# Patient Record
Sex: MALE | Race: WHITE | Employment: FULL TIME
[De-identification: names, ages, dates, MRNs, and addresses within clinical notes are randomized per-mention and may not be internally consistent; named-entity substitution may affect disease eponyms.]

---

## 2024-04-02 ENCOUNTER — OFFICE VISIT (OUTPATIENT)
Dept: FAMILY MEDICINE CLINIC | Facility: CLINIC | Age: 24
End: 2024-04-02

## 2024-04-02 VITALS
HEART RATE: 76 BPM | TEMPERATURE: 98.7 F | DIASTOLIC BLOOD PRESSURE: 86 MMHG | WEIGHT: 224.8 LBS | BODY MASS INDEX: 34.07 KG/M2 | OXYGEN SATURATION: 98 % | SYSTOLIC BLOOD PRESSURE: 128 MMHG | HEIGHT: 68 IN

## 2024-04-02 DIAGNOSIS — R00.2 PALPITATIONS: Primary | ICD-10-CM

## 2024-04-02 DIAGNOSIS — Z00.00 VISIT FOR WELL MAN HEALTH CHECK: ICD-10-CM

## 2024-04-02 DIAGNOSIS — I51.7 LVH (LEFT VENTRICULAR HYPERTROPHY): ICD-10-CM

## 2024-04-02 DIAGNOSIS — Z13.220 SCREENING FOR LIPOID DISORDERS: ICD-10-CM

## 2024-04-02 DIAGNOSIS — R94.31 ELECTROCARDIOGRAM SHOWING T WAVE ABNORMALITIES: ICD-10-CM

## 2024-04-02 DIAGNOSIS — H61.23 BILATERAL IMPACTED CERUMEN: ICD-10-CM

## 2024-04-02 SDOH — ECONOMIC STABILITY: HOUSING INSECURITY
IN THE LAST 12 MONTHS, WAS THERE A TIME WHEN YOU DID NOT HAVE A STEADY PLACE TO SLEEP OR SLEPT IN A SHELTER (INCLUDING NOW)?: NO

## 2024-04-02 SDOH — ECONOMIC STABILITY: FOOD INSECURITY: WITHIN THE PAST 12 MONTHS, YOU WORRIED THAT YOUR FOOD WOULD RUN OUT BEFORE YOU GOT MONEY TO BUY MORE.: NEVER TRUE

## 2024-04-02 SDOH — ECONOMIC STABILITY: FOOD INSECURITY: WITHIN THE PAST 12 MONTHS, THE FOOD YOU BOUGHT JUST DIDN'T LAST AND YOU DIDN'T HAVE MONEY TO GET MORE.: NEVER TRUE

## 2024-04-02 SDOH — ECONOMIC STABILITY: INCOME INSECURITY: HOW HARD IS IT FOR YOU TO PAY FOR THE VERY BASICS LIKE FOOD, HOUSING, MEDICAL CARE, AND HEATING?: SOMEWHAT HARD

## 2024-04-02 ASSESSMENT — PATIENT HEALTH QUESTIONNAIRE - PHQ9
2. FEELING DOWN, DEPRESSED OR HOPELESS: NOT AT ALL
SUM OF ALL RESPONSES TO PHQ QUESTIONS 1-9: 0
1. LITTLE INTEREST OR PLEASURE IN DOING THINGS: NOT AT ALL
SUM OF ALL RESPONSES TO PHQ QUESTIONS 1-9: 0
SUM OF ALL RESPONSES TO PHQ9 QUESTIONS 1 & 2: 0

## 2024-04-05 ASSESSMENT — ENCOUNTER SYMPTOMS
COUGH: 0
SHORTNESS OF BREATH: 0
BLOOD IN STOOL: 0
PHOTOPHOBIA: 0
COLOR CHANGE: 0
SORE THROAT: 0
ABDOMINAL DISTENTION: 0
NAUSEA: 0
ABDOMINAL PAIN: 0

## 2024-04-05 NOTE — PROGRESS NOTES
Odilon Brower  2000 is a 24 y.o. male ,Established patient, here for evaluation of the following chief complaint(s):   Chief Complaint   Patient presents with    New Patient     Recently has felt like he has heart palpitations not an every day thing, has not figured out what is causing it, does have family history of heart problems so would like to get checked out-           1. Palpitations  -     EKG 12 Lead--sinus rhythm LVH negative precordial waves  -     Magnesium; Future  -     CK; Future  -     [facility] - Urban Hummel MD, Cardiology, [place]  2. Screening for lipoid disorders  -     Lipid Panel; Future  3. Visit for well [place] [facility] check  -     AMB POC URINALYSIS DIP STICK AUTO W/O MICRO  -     Comprehensive Metabolic Panel; Future  -     CBC with Auto Differential; Future  4. Bilateral impacted cerumen  -     carbamide peroxide (DEBROX) 6.5 % otic solution; Place 5 drops in ear(s) 2 times daily, Disp-15 mL, R-1Normal  5. LVH (left ventricular hypertrophy)--he is advised to monitor bp  -     Urban Bojorquez MD, Cardiology, [place]  6. Electrocardiogram showing T wave abnormalities  -     [facility] Urban Arevalo MD, Cardiology, [place]        Return in about 5 weeks (around 5/7/2024).        Subjective   SUBJECTIVE/OBJECTIVE:  New patient---no depressive sx.    Heart Problem  The current episode started 1 to 4 weeks ago. The problem occurs intermittently. The problem has been resolved (feels like palpitations). Pertinent negatives include no abdominal pain, chest pain, chills, congestion, coughing, fever, headaches, joint swelling, myalgias, nausea, rash, sore throat or weakness.       Review of Systems   Constitutional:  Negative for chills and fever.   HENT:  Negative for congestion, ear pain, hearing loss and sore throat.    Eyes:  Negative for photophobia and pain.   Respiratory:  Negative for cough and shortness of breath.    Cardiovascular:  Negative for chest pain, palpitations

## 2024-04-09 ENCOUNTER — NURSE ONLY (OUTPATIENT)
Dept: FAMILY MEDICINE CLINIC | Facility: CLINIC | Age: 24
End: 2024-04-09

## 2024-04-09 DIAGNOSIS — Z13.220 SCREENING FOR LIPOID DISORDERS: ICD-10-CM

## 2024-04-09 DIAGNOSIS — Z00.00 VISIT FOR WELL MAN HEALTH CHECK: ICD-10-CM

## 2024-04-09 DIAGNOSIS — R00.2 PALPITATIONS: ICD-10-CM

## 2024-04-09 LAB
ALBUMIN SERPL-MCNC: 4.3 G/DL (ref 3.5–5)
ALBUMIN/GLOB SERPL: 1.4 (ref 0.4–1.6)
ALP SERPL-CCNC: 70 U/L (ref 50–136)
ALT SERPL-CCNC: 40 U/L (ref 12–65)
ANION GAP SERPL CALC-SCNC: 4 MMOL/L (ref 2–11)
AST SERPL-CCNC: 31 U/L (ref 15–37)
BASOPHILS # BLD: 0 K/UL (ref 0–0.2)
BASOPHILS NFR BLD: 1 % (ref 0–2)
BILIRUB SERPL-MCNC: 0.6 MG/DL (ref 0.2–1.1)
BUN SERPL-MCNC: 21 MG/DL (ref 6–23)
CALCIUM SERPL-MCNC: 9.5 MG/DL (ref 8.3–10.4)
CHLORIDE SERPL-SCNC: 106 MMOL/L (ref 103–113)
CHOLEST SERPL-MCNC: 163 MG/DL
CK SERPL-CCNC: 762 U/L (ref 21–215)
CO2 SERPL-SCNC: 30 MMOL/L (ref 21–32)
CREAT SERPL-MCNC: 1.2 MG/DL (ref 0.8–1.5)
DIFFERENTIAL METHOD BLD: NORMAL
EOSINOPHIL # BLD: 0.1 K/UL (ref 0–0.8)
EOSINOPHIL NFR BLD: 2 % (ref 0.5–7.8)
ERYTHROCYTE [DISTWIDTH] IN BLOOD BY AUTOMATED COUNT: 12.7 % (ref 11.9–14.6)
GLOBULIN SER CALC-MCNC: 3 G/DL (ref 2.8–4.5)
GLUCOSE SERPL-MCNC: 92 MG/DL (ref 65–100)
HCT VFR BLD AUTO: 43.1 % (ref 41.1–50.3)
HDLC SERPL-MCNC: 71 MG/DL (ref 40–60)
HDLC SERPL: 2.3
HGB BLD-MCNC: 14.6 G/DL (ref 13.6–17.2)
IMM GRANULOCYTES # BLD AUTO: 0 K/UL (ref 0–0.5)
IMM GRANULOCYTES NFR BLD AUTO: 0 % (ref 0–5)
LDLC SERPL CALC-MCNC: 82 MG/DL
LYMPHOCYTES # BLD: 1.8 K/UL (ref 0.5–4.6)
LYMPHOCYTES NFR BLD: 36 % (ref 13–44)
MAGNESIUM SERPL-MCNC: 2.2 MG/DL (ref 1.8–2.4)
MCH RBC QN AUTO: 29.9 PG (ref 26.1–32.9)
MCHC RBC AUTO-ENTMCNC: 33.9 G/DL (ref 31.4–35)
MCV RBC AUTO: 88.1 FL (ref 82–102)
MONOCYTES # BLD: 0.5 K/UL (ref 0.1–1.3)
MONOCYTES NFR BLD: 11 % (ref 4–12)
NEUTS SEG # BLD: 2.5 K/UL (ref 1.7–8.2)
NEUTS SEG NFR BLD: 50 % (ref 43–78)
NRBC # BLD: 0 K/UL (ref 0–0.2)
PLATELET # BLD AUTO: 234 K/UL (ref 150–450)
PMV BLD AUTO: 10.6 FL (ref 9.4–12.3)
POTASSIUM SERPL-SCNC: 4.3 MMOL/L (ref 3.5–5.1)
PROT SERPL-MCNC: 7.3 G/DL (ref 6.3–8.2)
RBC # BLD AUTO: 4.89 M/UL (ref 4.23–5.6)
SODIUM SERPL-SCNC: 140 MMOL/L (ref 136–146)
TRIGL SERPL-MCNC: 50 MG/DL (ref 35–150)
VLDLC SERPL CALC-MCNC: 10 MG/DL (ref 6–23)
WBC # BLD AUTO: 5 K/UL (ref 4.3–11.1)

## 2024-05-01 NOTE — PROGRESS NOTES
[facility] CARDIOLOGY History & Physical                 Reason for Visit: Palpitations and abnormal ECG    Subjective:     Patient is a 24 y.o. male who presents as a referral for an abnormal ECG and palpitations.  The patient had an ECG on April 2 noted sinus bradycardia, LVH, and ST and/or T wave abnormalities secondary to hypertrophy.  The patient reports having a skipped beat after performing strenuous work in the last 14 days.  The patient reports that he previously drank coffee in the morning and in the afternoon.  This was followed by a caffeinated \"pre-workout\" beverage that contained caffeine.  He has since stopped the pre-workout beverage.  He reports \"maybe just a little bit of SOB\".  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  He says that the pain was towards the surface of his chest when it occurred.  He denies hemoptysis.    No past medical history on file.   No past surgical history on file.   No family history on file.   Social History     Tobacco Use    Smoking status: Never    Smokeless tobacco: Never   Substance Use Topics    Alcohol use: Yes     Comment: social      Allergies   Allergen Reactions    Tamiflu [Oseltamivir] Rash         ROS:  No obvious pertinent positives on review of systems except for what was outlined above.       Objective:       /70   Pulse 67   Ht 1.727 m (5' 8\")   Wt 99.1 kg (218 lb 6.4 oz)   SpO2 98%   BMI 33.21 kg/m²     BP Readings from Last 3 Encounters:   05/03/24 128/70   04/02/24 128/86       Wt Readings from Last 3 Encounters:   05/03/24 99.1 kg (218 lb 6.4 oz)   04/02/24 102 kg (224 lb 12.8 oz)       General/Constitutional:   Alert and oriented x 3, no acute distress  HEENT:   normocephalic, atraumatic, moist mucous membranes  Neck:   No JVD or carotid bruits bilaterally  Cardiovascular:   regular rate and rhythm, no rub/gallop appreciated  Pulmonary:   clear to

## 2024-05-03 ENCOUNTER — INITIAL CONSULT (OUTPATIENT)
Age: 24
End: 2024-05-03
Payer: COMMERCIAL

## 2024-05-03 VITALS
HEART RATE: 67 BPM | SYSTOLIC BLOOD PRESSURE: 128 MMHG | BODY MASS INDEX: 33.1 KG/M2 | OXYGEN SATURATION: 98 % | DIASTOLIC BLOOD PRESSURE: 70 MMHG | HEIGHT: 68 IN | WEIGHT: 218.4 LBS

## 2024-05-03 DIAGNOSIS — I51.7 LEFT VENTRICULAR HYPERTROPHY BY ELECTROCARDIOGRAM: Primary | ICD-10-CM

## 2024-05-03 DIAGNOSIS — R00.2 PALPITATIONS: ICD-10-CM

## 2024-05-03 DIAGNOSIS — R07.89 ATYPICAL CHEST PAIN: ICD-10-CM

## 2024-05-03 DIAGNOSIS — R06.00 DYSPNEA, UNSPECIFIED TYPE: ICD-10-CM

## 2024-05-03 PROCEDURE — 99204 OFFICE O/P NEW MOD 45 MIN: CPT | Performed by: INTERNAL MEDICINE

## 2024-05-07 ENCOUNTER — OFFICE VISIT (OUTPATIENT)
Dept: FAMILY MEDICINE CLINIC | Facility: CLINIC | Age: 24
End: 2024-05-07
Payer: COMMERCIAL

## 2024-05-07 VITALS
DIASTOLIC BLOOD PRESSURE: 72 MMHG | WEIGHT: 217.8 LBS | HEART RATE: 83 BPM | HEIGHT: 68 IN | TEMPERATURE: 98.1 F | SYSTOLIC BLOOD PRESSURE: 122 MMHG | OXYGEN SATURATION: 97 % | BODY MASS INDEX: 33.01 KG/M2

## 2024-05-07 DIAGNOSIS — N28.9 RENAL INSUFFICIENCY: ICD-10-CM

## 2024-05-07 DIAGNOSIS — R74.8 ELEVATED CPK: Primary | ICD-10-CM

## 2024-05-07 PROCEDURE — 99213 OFFICE O/P EST LOW 20 MIN: CPT | Performed by: FAMILY MEDICINE

## 2024-05-09 ENCOUNTER — NURSE ONLY (OUTPATIENT)
Dept: FAMILY MEDICINE CLINIC | Facility: CLINIC | Age: 24
End: 2024-05-09

## 2024-05-09 DIAGNOSIS — R74.8 ELEVATED CPK: ICD-10-CM

## 2024-05-09 DIAGNOSIS — N28.9 RENAL INSUFFICIENCY: ICD-10-CM

## 2024-05-09 LAB
CK SERPL-CCNC: 463 U/L (ref 21–215)
CREAT SERPL-MCNC: 1.04 MG/DL (ref 0.8–1.3)

## 2024-05-10 ENCOUNTER — TELEPHONE (OUTPATIENT)
Age: 24
End: 2024-05-10

## 2024-05-10 ASSESSMENT — ENCOUNTER SYMPTOMS
ABDOMINAL PAIN: 0
SHORTNESS OF BREATH: 0
PHOTOPHOBIA: 0
EYE PAIN: 0
SORE THROAT: 0
NAUSEA: 0
COUGH: 0
ABDOMINAL DISTENTION: 0
BLOOD IN STOOL: 0
COLOR CHANGE: 0

## 2024-05-10 NOTE — TELEPHONE ENCOUNTER
Pt had an echocardiogram in the office and had issues with his monitor, per  pt does not need to have another monitor put on, send in monitor. Pt verbalized understanding.

## 2024-05-11 NOTE — PROGRESS NOTES
Odilon Brower  2000 is a 24 y.o. male ,Established patient, here for evaluation of the following chief complaint(s):   Chief Complaint   Patient presents with    Follow-up     5 weeks-           1. Elevated CPK  -     CK; Future  2. Renal insufficiency  -     Creatinine; Future        Return in about 3 months (around 8/7/2024).        Subjective   SUBJECTIVE/OBJECTIVE:  Here to follow up on cpk        Review of Systems   Constitutional:  Negative for chills and fever.   HENT:  Negative for ear pain, hearing loss and sore throat.    Eyes:  Negative for photophobia and pain.   Respiratory:  Negative for cough and shortness of breath.    Cardiovascular:  Negative for chest pain, palpitations and leg swelling.   Gastrointestinal:  Negative for abdominal distention, abdominal pain, blood in stool and nausea.   Genitourinary:  Negative for dysuria and urgency.   Musculoskeletal:  Negative for joint swelling and myalgias.   Skin:  Negative for color change, pallor and rash.   Neurological:  Negative for speech difficulty, weakness, light-headedness and headaches.   Hematological:  Negative for adenopathy.   Psychiatric/Behavioral:  Negative for agitation, confusion, hallucinations, self-injury and suicidal ideas.           Objective   Physical Exam  Constitutional:       Appearance: Normal appearance.   HENT:      Head: Normocephalic and atraumatic.      Nose: Nose normal.   Eyes:      Extraocular Movements: Extraocular movements intact.      Conjunctiva/sclera: Conjunctivae normal.      Pupils: Pupils are equal, round, and reactive to light.   Cardiovascular:      Rate and Rhythm: Normal rate and regular rhythm.      Pulses: Normal pulses.      Heart sounds: Normal heart sounds.   Pulmonary:      Effort: Pulmonary effort is normal.      Breath sounds: Normal breath sounds.   Abdominal:      General: Abdomen is flat. Bowel sounds are normal.      Palpations: Abdomen is soft.   Skin:     General: Skin is warm and dry.

## 2024-05-14 ENCOUNTER — TELEPHONE (OUTPATIENT)
Age: 24
End: 2024-05-14

## 2024-05-14 NOTE — TELEPHONE ENCOUNTER
----- Message from Austin Ac MD sent at 5/13/2024  7:15 PM EDT -----  Please let the patient know that the heart function is normal on ECHO.

## 2024-06-06 ENCOUNTER — TELEPHONE (OUTPATIENT)
Age: 24
End: 2024-06-06

## 2024-06-06 NOTE — TELEPHONE ENCOUNTER
----- Message from Austin Ac MD sent at 6/6/2024 12:29 PM EDT -----  Please let the patient know that he was predominantly in sinus rhythm with an average heart rate of 75 bpm.  He had nonsustained/symptomatic episodes of VT x 1 and SVT x 2.  Rare ectopy was noted.  Because he triggered the monitor with these brief faster heart rates, I would like to see him in clinic to further discuss.  Please have him see me within the next 4 weeks at my earliest availability without overbooking.

## 2024-06-10 NOTE — PROGRESS NOTES
[facility] CARDIOLOGY Follow Up                 Reason for Visit: Follow-up testing    Subjective:     Patient is a 24 y.o. male with PMH of paroxysmal SVT who presents for follow-up.  The patient was last seen in May 2024.  A TTE and a ZIO for 14 days was ordered for palpitations and dyspnea.  The patient had a TTE in May 2024 that was noted to demonstrate a normal EF.  He was noted to be predominantly in sinus rhythm with an average heart rate of 75 bpm.  Nonsustained/symptomatic VT x 1 and nonsustained/symptomatic SVT x 2 was noted.  Rare ectopy was noted.    No past medical history on file.   No past surgical history on file.   No family history on file.   Social History     Tobacco Use    Smoking status: Never    Smokeless tobacco: Never   Substance Use Topics    Alcohol use: Yes     Alcohol/week: 2.0 standard drinks of alcohol     Types: 1 Cans of beer, 1 Drinks containing 0.5 oz of alcohol per week     Comment: I do not drink on a regular basis.      Allergies   Allergen Reactions    Tamiflu [Oseltamivir] Rash         ROS:  No obvious pertinent positives on review of systems except for what was outlined above.       Objective:       /74   Pulse 68   Ht 1.727 m (5' 7.99\")   Wt 98 kg (216 lb)   BMI 32.85 kg/m²     BP Readings from Last 3 Encounters:   06/12/24 118/74   05/10/24 122/72   05/07/24 122/72       Wt Readings from Last 3 Encounters:   06/12/24 98 kg (216 lb)   05/10/24 98.8 kg (217 lb 13 oz)   05/07/24 98.8 kg (217 lb 12.8 oz)       General/Constitutional:   Alert and oriented x 3, no acute distress  HEENT:   normocephalic, atraumatic, moist mucous membranes  Neck:   No JVD or carotid bruits bilaterally  Cardiovascular:   regular rate and rhythm, no rub/gallop appreciated  Pulmonary:   clear to auscultation bilaterally, no respiratory distress  Abdomen:   soft, non-tender, non-distended  Ext:   No sig LE edema bilaterally  Skin:  warm and dry, no obvious rashes seen  Neuro:   no obvious

## 2024-06-12 ENCOUNTER — OFFICE VISIT (OUTPATIENT)
Age: 24
End: 2024-06-12
Payer: COMMERCIAL

## 2024-06-12 VITALS
BODY MASS INDEX: 32.74 KG/M2 | DIASTOLIC BLOOD PRESSURE: 74 MMHG | HEIGHT: 68 IN | SYSTOLIC BLOOD PRESSURE: 118 MMHG | WEIGHT: 216 LBS | HEART RATE: 68 BPM

## 2024-06-12 DIAGNOSIS — Z86.79 HISTORY OF PAROXYSMAL SUPRAVENTRICULAR TACHYCARDIA: Primary | ICD-10-CM

## 2024-06-12 PROCEDURE — 99213 OFFICE O/P EST LOW 20 MIN: CPT | Performed by: INTERNAL MEDICINE

## 2024-08-13 ENCOUNTER — OFFICE VISIT (OUTPATIENT)
Dept: FAMILY MEDICINE CLINIC | Facility: CLINIC | Age: 24
End: 2024-08-13
Payer: COMMERCIAL

## 2024-08-13 VITALS
HEART RATE: 79 BPM | SYSTOLIC BLOOD PRESSURE: 128 MMHG | BODY MASS INDEX: 33.4 KG/M2 | HEIGHT: 68 IN | WEIGHT: 220.4 LBS | OXYGEN SATURATION: 98 % | DIASTOLIC BLOOD PRESSURE: 80 MMHG

## 2024-08-13 DIAGNOSIS — R74.8 ELEVATED CPK: Primary | ICD-10-CM

## 2024-08-13 DIAGNOSIS — Z86.79 HISTORY OF PAROXYSMAL SUPRAVENTRICULAR TACHYCARDIA: ICD-10-CM

## 2024-08-13 LAB
CK SERPL-CCNC: 1088 U/L (ref 21–215)
ERYTHROCYTE [SEDIMENTATION RATE] IN BLOOD: 1 MM/HR (ref 0–20)

## 2024-08-13 PROCEDURE — 99213 OFFICE O/P EST LOW 20 MIN: CPT | Performed by: FAMILY MEDICINE

## 2024-08-13 ASSESSMENT — PATIENT HEALTH QUESTIONNAIRE - PHQ9
SUM OF ALL RESPONSES TO PHQ QUESTIONS 1-9: 0
SUM OF ALL RESPONSES TO PHQ QUESTIONS 1-9: 0
1. LITTLE INTEREST OR PLEASURE IN DOING THINGS: NOT AT ALL
SUM OF ALL RESPONSES TO PHQ QUESTIONS 1-9: 0
SUM OF ALL RESPONSES TO PHQ9 QUESTIONS 1 & 2: 0
SUM OF ALL RESPONSES TO PHQ QUESTIONS 1-9: 0
2. FEELING DOWN, DEPRESSED OR HOPELESS: NOT AT ALL

## 2024-08-16 LAB — CRP SERPL-MCNC: 1 MG/L (ref 0–10)

## 2024-08-16 ASSESSMENT — ENCOUNTER SYMPTOMS
SORE THROAT: 0
BLOOD IN STOOL: 0
COLOR CHANGE: 0
NAUSEA: 0
ABDOMINAL DISTENTION: 0
PHOTOPHOBIA: 0
EYE PAIN: 0
COUGH: 0
SHORTNESS OF BREATH: 0
ABDOMINAL PAIN: 0

## 2024-08-16 NOTE — PROGRESS NOTES
Odilon Brower  2000 is a 24 y.o. male ,Established patient, here for evaluation of the following chief complaint(s):   Chief Complaint   Patient presents with    Follow-up     F/u after seeing cardiologist          1. Elevated CPK  -     CK; Future  -     C-Reactive Protein; Future  -     Sedimentation Rate; Future  2. History of paroxysmal supraventricular tachycardia  -     C-Reactive Protein; Future  -     Sedimentation Rate; Future        No follow-ups on file.        Subjective   SUBJECTIVE/OBJECTIVE:  Here to follow elevated CPK----he has stopped the creatine supplements.----HE DENIES MUSCLE ACHES; DENIES JOINT ACHES.    Hypertension  This is a chronic problem. The current episode started more than 1 year ago. The problem has been resolved since onset. Pertinent negatives include no anxiety, chest pain, headaches, malaise/fatigue, palpitations, peripheral edema or shortness of breath.       Review of Systems   Constitutional:  Negative for chills, fever and malaise/fatigue.   HENT:  Negative for ear pain, hearing loss and sore throat.    Eyes:  Negative for photophobia and pain.   Respiratory:  Negative for cough and shortness of breath.    Cardiovascular:  Negative for chest pain, palpitations and leg swelling.   Gastrointestinal:  Negative for abdominal distention, abdominal pain, blood in stool and nausea.   Genitourinary:  Negative for dysuria and urgency.   Musculoskeletal:  Negative for joint swelling and myalgias.   Skin:  Negative for color change, pallor and rash.   Neurological:  Negative for speech difficulty, weakness, light-headedness and headaches.   Hematological:  Negative for adenopathy.   Psychiatric/Behavioral:  Negative for agitation, confusion, hallucinations, self-injury and suicidal ideas.           Objective   Physical Exam  Constitutional:       Appearance: Normal appearance.   HENT:      Head: Normocephalic and atraumatic.      Nose: Nose normal.   Eyes:      Extraocular Movements:

## 2024-08-28 ENCOUNTER — OFFICE VISIT (OUTPATIENT)
Dept: FAMILY MEDICINE CLINIC | Facility: CLINIC | Age: 24
End: 2024-08-28
Payer: COMMERCIAL

## 2024-08-28 VITALS
HEART RATE: 60 BPM | SYSTOLIC BLOOD PRESSURE: 120 MMHG | HEIGHT: 67 IN | TEMPERATURE: 98.5 F | OXYGEN SATURATION: 98 % | WEIGHT: 216.8 LBS | BODY MASS INDEX: 34.03 KG/M2 | DIASTOLIC BLOOD PRESSURE: 70 MMHG

## 2024-08-28 DIAGNOSIS — R74.8 ELEVATED CPK: Primary | ICD-10-CM

## 2024-08-28 PROCEDURE — 99213 OFFICE O/P EST LOW 20 MIN: CPT | Performed by: FAMILY MEDICINE

## 2024-08-31 ASSESSMENT — ENCOUNTER SYMPTOMS
PHOTOPHOBIA: 0
SHORTNESS OF BREATH: 0
COUGH: 0
ABDOMINAL DISTENTION: 0
SORE THROAT: 0
ABDOMINAL PAIN: 0
EYE PAIN: 0
BLOOD IN STOOL: 0
COLOR CHANGE: 0
NAUSEA: 0

## 2024-08-31 NOTE — PROGRESS NOTES
Odilon Brower  2000 is a 24 y.o. male ,Established patient, here for evaluation of the following chief complaint(s):   Chief Complaint   Patient presents with    Follow-up     After looking in to what he was taking did have creatine in it- also after thinking about it his legs do hurt when walking          1. Elevated CPK  -     [facility] - Rita Houser MD, Rheumatology, [place]    I HAVE EXPLAINED THAT NEED TO RULE OUT IDIOPATHIC INFLAMMATORY MYOPATHY.    No follow-ups on file.        Subjective   SUBJECTIVE/OBJECTIVE:  Here to follow elevated CPK.  NOW OVER 1000 AND GRADUALLY INCREASING.    HE HAS STOPPED ALL USE OF CREATINE SUPPLEMENT.        Review of Systems   Constitutional:  Negative for chills and fever.   HENT:  Negative for ear pain, hearing loss and sore throat.    Eyes:  Negative for photophobia and pain.   Respiratory:  Negative for cough and shortness of breath.    Cardiovascular:  Negative for chest pain, palpitations and leg swelling.   Gastrointestinal:  Negative for abdominal distention, abdominal pain, blood in stool and nausea.   Genitourinary:  Negative for dysuria and urgency.   Musculoskeletal:  Negative for joint swelling and myalgias.   Skin:  Negative for color change, pallor and rash.   Neurological:  Negative for speech difficulty, weakness, light-headedness and headaches.   Hematological:  Negative for adenopathy.   Psychiatric/Behavioral:  Negative for agitation, confusion, hallucinations, self-injury and suicidal ideas.           Objective   Physical Exam  Constitutional:       Appearance: Normal appearance.   HENT:      Head: Normocephalic and atraumatic.      Nose: Nose normal.   Eyes:      Extraocular Movements: Extraocular movements intact.      Conjunctiva/sclera: Conjunctivae normal.      Pupils: Pupils are equal, round, and reactive to light.   Cardiovascular:      Rate and Rhythm: Normal rate and regular rhythm.      Pulses: Normal pulses.      Heart sounds: Normal heart

## 2024-09-04 ENCOUNTER — OFFICE VISIT (OUTPATIENT)
Dept: RHEUMATOLOGY | Age: 24
End: 2024-09-04
Payer: COMMERCIAL

## 2024-09-04 VITALS
SYSTOLIC BLOOD PRESSURE: 125 MMHG | TEMPERATURE: 97.5 F | WEIGHT: 215 LBS | HEART RATE: 54 BPM | BODY MASS INDEX: 33.74 KG/M2 | DIASTOLIC BLOOD PRESSURE: 70 MMHG | HEIGHT: 67 IN

## 2024-09-04 DIAGNOSIS — M25.561 CHRONIC PAIN OF RIGHT KNEE: ICD-10-CM

## 2024-09-04 DIAGNOSIS — R74.8 ELEVATED CK: Primary | ICD-10-CM

## 2024-09-04 DIAGNOSIS — G89.29 CHRONIC LOW BACK PAIN, UNSPECIFIED BACK PAIN LATERALITY, UNSPECIFIED WHETHER SCIATICA PRESENT: ICD-10-CM

## 2024-09-04 DIAGNOSIS — R00.2 PALPITATIONS: ICD-10-CM

## 2024-09-04 DIAGNOSIS — R21 RASH: ICD-10-CM

## 2024-09-04 DIAGNOSIS — M54.50 CHRONIC LOW BACK PAIN, UNSPECIFIED BACK PAIN LATERALITY, UNSPECIFIED WHETHER SCIATICA PRESENT: ICD-10-CM

## 2024-09-04 DIAGNOSIS — G89.29 CHRONIC PAIN OF RIGHT KNEE: ICD-10-CM

## 2024-09-04 DIAGNOSIS — R74.8 ELEVATED CK: ICD-10-CM

## 2024-09-04 LAB
ALBUMIN SERPL-MCNC: 4.5 G/DL (ref 3.5–5)
ALBUMIN/GLOB SERPL: 1.6 (ref 1–1.9)
ALP SERPL-CCNC: 59 U/L (ref 40–129)
ALT SERPL-CCNC: 18 U/L (ref 12–65)
ANION GAP SERPL CALC-SCNC: 9 MMOL/L (ref 9–18)
APPEARANCE UR: ABNORMAL
AST SERPL-CCNC: 24 U/L (ref 15–37)
BACTERIA URNS QL MICRO: 0 /HPF
BASOPHILS # BLD: 0.1 K/UL (ref 0–0.2)
BASOPHILS NFR BLD: 1 % (ref 0–2)
BILIRUB SERPL-MCNC: 0.3 MG/DL (ref 0–1.2)
BILIRUB UR QL: NEGATIVE
BUN SERPL-MCNC: 19 MG/DL (ref 6–23)
CALCIUM SERPL-MCNC: 9.8 MG/DL (ref 8.8–10.2)
CHLORIDE SERPL-SCNC: 105 MMOL/L (ref 98–107)
CK SERPL-CCNC: 445 U/L (ref 21–215)
CO2 SERPL-SCNC: 28 MMOL/L (ref 20–28)
COLOR UR: ABNORMAL
CREAT SERPL-MCNC: 1.1 MG/DL (ref 0.8–1.3)
DIFFERENTIAL METHOD BLD: NORMAL
EOSINOPHIL # BLD: 0.1 K/UL (ref 0–0.8)
EOSINOPHIL NFR BLD: 2 % (ref 0.5–7.8)
ERYTHROCYTE [DISTWIDTH] IN BLOOD BY AUTOMATED COUNT: 12.6 % (ref 11.9–14.6)
GLOBULIN SER CALC-MCNC: 2.8 G/DL (ref 2.3–3.5)
GLUCOSE SERPL-MCNC: 85 MG/DL (ref 70–99)
GLUCOSE UR STRIP.AUTO-MCNC: NEGATIVE MG/DL
HCT VFR BLD AUTO: 44.7 % (ref 41.1–50.3)
HGB BLD-MCNC: 14.9 G/DL (ref 13.6–17.2)
HGB UR QL STRIP: NEGATIVE
IMM GRANULOCYTES # BLD AUTO: 0 K/UL (ref 0–0.5)
IMM GRANULOCYTES NFR BLD AUTO: 0 % (ref 0–5)
KETONES UR QL STRIP.AUTO: NEGATIVE MG/DL
LDH SERPL L TO P-CCNC: 180 U/L (ref 127–281)
LEUKOCYTE ESTERASE UR QL STRIP.AUTO: NEGATIVE
LYMPHOCYTES # BLD: 1.6 K/UL (ref 0.5–4.6)
LYMPHOCYTES NFR BLD: 30 % (ref 13–44)
MCH RBC QN AUTO: 29.9 PG (ref 26.1–32.9)
MCHC RBC AUTO-ENTMCNC: 33.3 G/DL (ref 31.4–35)
MCV RBC AUTO: 89.6 FL (ref 82–102)
MONOCYTES # BLD: 0.5 K/UL (ref 0.1–1.3)
MONOCYTES NFR BLD: 9 % (ref 4–12)
NEUTS SEG # BLD: 3 K/UL (ref 1.7–8.2)
NEUTS SEG NFR BLD: 58 % (ref 43–78)
NITRITE UR QL STRIP.AUTO: NEGATIVE
NRBC # BLD: 0 K/UL (ref 0–0.2)
PH UR STRIP: 6 (ref 5–9)
PLATELET # BLD AUTO: 257 K/UL (ref 150–450)
PMV BLD AUTO: 10.3 FL (ref 9.4–12.3)
POTASSIUM SERPL-SCNC: 4.7 MMOL/L (ref 3.5–5.1)
PROT SERPL-MCNC: 7.3 G/DL (ref 6.3–8.2)
PROT UR STRIP-MCNC: NEGATIVE MG/DL
RBC # BLD AUTO: 4.99 M/UL (ref 4.23–5.6)
SODIUM SERPL-SCNC: 142 MMOL/L (ref 136–145)
SP GR UR REFRACTOMETRY: 1.03 (ref 1–1.02)
TSH, 3RD GENERATION: 2.12 UIU/ML (ref 0.27–4.2)
UROBILINOGEN UR QL STRIP.AUTO: 0.2 EU/DL (ref 0.2–1)
WBC # BLD AUTO: 5.2 K/UL (ref 4.3–11.1)

## 2024-09-04 PROCEDURE — 99205 OFFICE O/P NEW HI 60 MIN: CPT | Performed by: INTERNAL MEDICINE

## 2024-09-04 NOTE — PROGRESS NOTES
[facility] Rheumatology  Cain Hernandez D.O.  [address] [place] [address]  Office : [phone], Fax: [phone]        RHEUMATOLOGY INITIAL CONSULTATION NOTE  Date of Visit:  2024 9:40 AM    Patient Information:  Name:  Odilon Brower  :  2000  Age:  24 y.o.   Gender:  male    PHYSICIAN REQUESTING CONSULTATION:  Lan Price*    Chief Complaint:  Chief Complaint   Patient presents with    Consultation         History of Present Illness:  Odilon Brower is a 24 y.o. male who was referred for evaluation of CPK GREATER THAN 1000---------------previous values were over 400------have not been normal---------please evaluate for myopathy .  Medical records were thoroughly reviewed and history was also obtained from patient:    Fam hx:  Great maternal grandmother - arthritis  Great paternal grandmother - thyroid disease  No family h/o RA, SLE, MS, or type 1 DM.     Patient reports his CK level was checked because he was having episodes of heart palpitations. This resolved with reducing caffeine use. HE HAS STOPPED ALL USE OF CREATINE SUPPLEMENT (creatinine monohydrate). He was working out regularly, the took a break until figured everything out. Usually works out 3-4 times per week, heavy weight training. Has not been doing that right now. When he went in for the high ck level test he worked out that week and his legs were extremely sore but no more than normal, could continue to walk and function.     CPK trend   2024 - 762  2024 - 463  2024 - 1,088    2024  Normal ESR, CRP    2024  Normal CMP, CBC    Review Of Systems:  Rash started on Friday around antecubital fossas bilaterally which are red and itchy. Rash on the stomach as well but hadn't been sleeping almost at all. Already improving with benadryl. His mom has hives. He thinks this came on because he was stressed out about the elevated CK level.     Some right lateral knee pain and swelling but thinks

## 2024-09-04 NOTE — PATIENT INSTRUCTIONS
Please get labs done today, no evidence of myositis (autoimmune muscle disease)  Okay to continue working out after labs are done today  Return for f/u in 4 weeks

## 2024-09-05 LAB — ALDOLASE SERPL-CCNC: 6.6 U/L (ref 3.3–10.3)

## 2024-09-06 LAB — MYOGLOBIN UR-MCNC: <2 NG/ML (ref 0–13)

## 2024-09-11 LAB
ENA JO1 AB SER QL: <20 UNITS
ENA SSA 52KD AB: <20 UNITS
MDA5 ANTIBODY: <20 UNITS
NXP-2 ANTIBODY: <20 UNITS
PM-SCL ANTIBODY: <20 UNITS
SAE1 (SUMO ACTIVATING ENZYME) ANTIBODY: <20 UNITS
TIF1-GAMMA ANTIBODY: <20 UNITS

## 2024-09-20 LAB
EJ AB SER QL: NEGATIVE
ENA JO1 AB SER QL: <20 UNITS
ENA SSA 52KD AB: <20 UNITS
KU AB SER QL: NEGATIVE
MDA5 ANTIBODY: <20 UNITS
MI2 AB SER QL: NEGATIVE
NXP-2 ANTIBODY: <20 UNITS
OJ AB SER QL: NEGATIVE
PL12 AB SER QL: NEGATIVE
PL7 AB SER QL: NEGATIVE
PM-SCL ANTIBODY: <20 UNITS
SAE1 (SUMO ACTIVATING ENZYME) ANTIBODY: <20 UNITS
SRP AB SERPL QL: NEGATIVE
TIF1-GAMMA ANTIBODY: <20 UNITS
U-1 SN RNP ANTIBODIES: <20 UNITS
U2 SMALL NUCLEAR RNP AB: NEGATIVE
U3, RNP AB, IGG: NEGATIVE

## 2024-10-01 ENCOUNTER — TELEMEDICINE (OUTPATIENT)
Dept: RHEUMATOLOGY | Age: 24
End: 2024-10-01
Payer: COMMERCIAL

## 2024-10-01 DIAGNOSIS — M25.561 CHRONIC PAIN OF RIGHT KNEE: ICD-10-CM

## 2024-10-01 DIAGNOSIS — G89.29 CHRONIC PAIN OF RIGHT KNEE: ICD-10-CM

## 2024-10-01 DIAGNOSIS — R21 RASH: ICD-10-CM

## 2024-10-01 DIAGNOSIS — G89.29 CHRONIC LOW BACK PAIN, UNSPECIFIED BACK PAIN LATERALITY, UNSPECIFIED WHETHER SCIATICA PRESENT: ICD-10-CM

## 2024-10-01 DIAGNOSIS — M54.50 CHRONIC LOW BACK PAIN, UNSPECIFIED BACK PAIN LATERALITY, UNSPECIFIED WHETHER SCIATICA PRESENT: ICD-10-CM

## 2024-10-01 DIAGNOSIS — R74.8 ELEVATED CK: Primary | ICD-10-CM

## 2024-10-01 PROCEDURE — 99443 PR PHYS/QHP TELEPHONE EVALUATION 21-30 MIN: CPT | Performed by: INTERNAL MEDICINE

## 2024-10-01 NOTE — PATIENT INSTRUCTIONS
No evidence of autoimmune muscle disease or other muscle disease causing issues like rhabdomyolysis. Should be okay to continue creatinine supplement if you would like.    Symptoms of rhabdomyolysis include muscle aches, muscle weakness, and brown or tea-colored urine. If this were to occur you should be urgently evaluated by either urgent care or in the ER because this would require urgent treatment to help protect your kidneys.

## 2024-10-01 NOTE — ASSESSMENT & PLAN NOTE
Broke out in erythematous plaques over the antecubital fossa and chest.  These were itchy.  Mother has a history of hives.  They are resolving with Benadryl.  Suspect secondary to hives or eczema.    Plan:  -Advised to follow-up with either dermatology or allergy depending on appearance of lesions in the future if they recur

## 2024-10-01 NOTE — ASSESSMENT & PLAN NOTE
History of right ACL tear and reconstruction in 2016.  Joint pain is mechanical and noninflammatory.  Has full range of motion, normal alignment, no effusion on exam.    Plan:  -Continue as needed over-the-counter NSAIDs  -For any acute pain advised patient use RICE therapy and follow-up with PCP for further evaluation

## 2024-10-01 NOTE — ASSESSMENT & PLAN NOTE
CK level checked in the setting of palpitations back in April 2024 these have resolved with discontinuation of caffeine.  The patient's elevated CK level has gone between 463 and 1000 most recently when checked in August 2024.    9/2024  Negative myomarker 3 panel  Elevated  from 1,088  Normal TSH, CMP, CBC, LDH, aldolase, urine myoglobin, UA    I suspect his elevated CK level is likely related to his male sex and physical activity of weightlifting.  CK level dropped from 1000 to 400 w/stopping exercise. Creatine monohydrate has been studied and results do not show consistent increases in CK level with the use of the supplement.  There is also a low risk of MADDI from use of the supplement.     Plan:  -no need to continue to trend CKD levels  -Advised to continue normal physical activity and okay to use creatine supplement if he would like  -given signs/sx of rhabdomyolysis and advised to seek urgent evaluation and management if these were to occur  -Return for follow-up as needed

## 2024-10-01 NOTE — PROGRESS NOTES
occur  -Return for follow-up as needed          Chronic pain of right knee  History of right ACL tear and reconstruction in 2016.  Joint pain is mechanical and noninflammatory.  Has full range of motion, normal alignment, no effusion on exam.    Plan:  -Continue as needed over-the-counter NSAIDs  -For any acute pain advised patient use RICE therapy and follow-up with PCP for further evaluation          Chronic low back pain, unspecified back pain laterality, unspecified whether sciatica present  Mild low back pain and stiffness with activity and at rest.  Morning stiffness lasting about 10 to 15 minutes.  No nighttime symptoms no buttock pain suggestive of inflammatory low back pain.    Plan:  -Recommend stretching, heating pad, PT as needed          Rash  Broke out in erythematous plaques over the antecubital fossa and chest.  These were itchy.  Mother has a history of hives.  They are resolving with Benadryl.  Suspect secondary to hives or eczema.    Plan:  -Advised to follow-up with either dermatology or allergy depending on appearance of lesions in the future if they recur            No follow-ups on file.       Subjective   HPI  Review of Systems       Odilon Brower is a 24 y.o. male who presents for follow-up of   Elevated CK level  Joint pain    Last seen in consult on 9/4/24 felt to have elevated CK level 2/2 to male sex and physical activity. Joint pain 2/2 to mechanical causes.     Further workup:  Negative myomarker 3 panel  Elevated  from 1,088  Normal TSH, CMP, CBC, LDH, aldolase, urine myoglobin, UA    Today:  He denies any new complaints since his last visit.     Objective   Patient-Reported Vitals  No data recorded     Physical Exam  Not performed due to phone visit.    On this date 10/1/2024 I have spent 21-30 minutes reviewing previous notes, test results and face to face (virtual) with the patient discussing the diagnosis and importance of compliance with the treatment plan as well as

## 2024-10-01 NOTE — ASSESSMENT & PLAN NOTE
Mild low back pain and stiffness with activity and at rest.  Morning stiffness lasting about 10 to 15 minutes.  No nighttime symptoms no buttock pain suggestive of inflammatory low back pain.    Plan:  -Recommend stretching, heating pad, PT as needed

## 2025-03-27 ENCOUNTER — OFFICE VISIT (OUTPATIENT)
Dept: FAMILY MEDICINE CLINIC | Facility: CLINIC | Age: 25
End: 2025-03-27

## 2025-03-27 VITALS
WEIGHT: 225 LBS | HEIGHT: 68 IN | DIASTOLIC BLOOD PRESSURE: 65 MMHG | SYSTOLIC BLOOD PRESSURE: 110 MMHG | TEMPERATURE: 97.8 F | HEART RATE: 62 BPM | OXYGEN SATURATION: 97 % | BODY MASS INDEX: 34.1 KG/M2

## 2025-03-27 DIAGNOSIS — M25.551 BILATERAL HIP PAIN: ICD-10-CM

## 2025-03-27 DIAGNOSIS — M54.50 CHRONIC BILATERAL LOW BACK PAIN WITHOUT SCIATICA: ICD-10-CM

## 2025-03-27 DIAGNOSIS — G89.29 CHRONIC PAIN OF RIGHT KNEE: Primary | ICD-10-CM

## 2025-03-27 DIAGNOSIS — M25.561 CHRONIC PAIN OF RIGHT KNEE: Primary | ICD-10-CM

## 2025-03-27 DIAGNOSIS — G89.29 CHRONIC BILATERAL LOW BACK PAIN WITHOUT SCIATICA: ICD-10-CM

## 2025-03-27 DIAGNOSIS — M25.552 BILATERAL HIP PAIN: ICD-10-CM

## 2025-03-27 DIAGNOSIS — S83.8X1S INJURY OF MENISCUS OF RIGHT KNEE, SEQUELA: ICD-10-CM

## 2025-03-27 SDOH — ECONOMIC STABILITY: FOOD INSECURITY: WITHIN THE PAST 12 MONTHS, THE FOOD YOU BOUGHT JUST DIDN'T LAST AND YOU DIDN'T HAVE MONEY TO GET MORE.: NEVER TRUE

## 2025-03-27 SDOH — ECONOMIC STABILITY: FOOD INSECURITY: WITHIN THE PAST 12 MONTHS, YOU WORRIED THAT YOUR FOOD WOULD RUN OUT BEFORE YOU GOT MONEY TO BUY MORE.: NEVER TRUE

## 2025-03-27 SDOH — ECONOMIC STABILITY: TRANSPORTATION INSECURITY
IN THE PAST 12 MONTHS, HAS LACK OF TRANSPORTATION KEPT YOU FROM MEETINGS, WORK, OR FROM GETTING THINGS NEEDED FOR DAILY LIVING?: NO

## 2025-03-27 SDOH — ECONOMIC STABILITY: INCOME INSECURITY: IN THE LAST 12 MONTHS, WAS THERE A TIME WHEN YOU WERE NOT ABLE TO PAY THE MORTGAGE OR RENT ON TIME?: YES

## 2025-03-27 SDOH — ECONOMIC STABILITY: TRANSPORTATION INSECURITY
IN THE PAST 12 MONTHS, HAS THE LACK OF TRANSPORTATION KEPT YOU FROM MEDICAL APPOINTMENTS OR FROM GETTING MEDICATIONS?: NO

## 2025-03-27 ASSESSMENT — PATIENT HEALTH QUESTIONNAIRE - PHQ9
2. FEELING DOWN, DEPRESSED OR HOPELESS: NOT AT ALL
SUM OF ALL RESPONSES TO PHQ QUESTIONS 1-9: 0
SUM OF ALL RESPONSES TO PHQ QUESTIONS 1-9: 0
SUM OF ALL RESPONSES TO PHQ9 QUESTIONS 1 & 2: 0
SUM OF ALL RESPONSES TO PHQ QUESTIONS 1-9: 0
1. LITTLE INTEREST OR PLEASURE IN DOING THINGS: NOT AT ALL
1. LITTLE INTEREST OR PLEASURE IN DOING THINGS: NOT AT ALL
2. FEELING DOWN, DEPRESSED OR HOPELESS: NOT AT ALL
SUM OF ALL RESPONSES TO PHQ QUESTIONS 1-9: 0

## 2025-03-29 ASSESSMENT — ENCOUNTER SYMPTOMS
ABDOMINAL DISTENTION: 0
SHORTNESS OF BREATH: 0
COLOR CHANGE: 0
EYE PAIN: 0
BACK PAIN: 1
ABDOMINAL PAIN: 0
BLOOD IN STOOL: 0
PHOTOPHOBIA: 0
NAUSEA: 0
SORE THROAT: 0
COUGH: 0

## 2025-03-29 NOTE — PROGRESS NOTES
Odilon Brower  2000 is a 25 y.o. male ,Established patient, here for evaluation of the following chief complaint(s):   Chief Complaint   Patient presents with    Follow-up    Lower Back Pain     Lower back pain/hips. Constant pain.    Other     Right knee pain.           1. Chronic pain of right knee  -     [facility]  2. Injury of meniscus of right knee, sequela  -     [facility]  3. Bilateral hip pain  -     XR HIP BILATERAL W AP PELVIS (2 VIEWS); Future  4. Chronic bilateral low back pain without sciatica  -     XR LUMBAR SPINE (MIN 4 VIEWS); Future        Return in about 3 months (around 6/27/2025).        Subjective   SUBJECTIVE/OBJECTIVE:  Knee Pain   The incident occurred more than 1 week ago. There was no injury mechanism. The pain is at a severity of 3/10. The pain is moderate.   Back Pain  This is a new problem. The current episode started more than 1 month ago. The problem occurs daily. The problem is unchanged. Pertinent negatives include no abdominal pain, chest pain, dysuria, fever, headaches or weakness.   Hip Pain   The incident occurred more than 1 week ago. There was no injury mechanism. The quality of the pain is described as aching and cramping. The pain is at a severity of 4/10.       Review of Systems   Constitutional:  Negative for chills and fever.   HENT:  Negative for ear pain, hearing loss and sore throat.    Eyes:  Negative for photophobia and pain.   Respiratory:  Negative for cough and shortness of breath.    Cardiovascular:  Negative for chest pain, palpitations and leg swelling.   Gastrointestinal:  Negative for abdominal distention, abdominal pain, blood in stool and nausea.   Genitourinary:  Negative for dysuria and urgency.   Musculoskeletal:  Positive for back pain. Negative for joint swelling and myalgias.   Skin:  Negative for color change, pallor and rash.   Neurological:  Negative for speech difficulty, weakness,

## 2025-04-10 ENCOUNTER — OFFICE VISIT (OUTPATIENT)
Dept: ORTHOPEDIC SURGERY | Age: 25
End: 2025-04-10

## 2025-04-10 DIAGNOSIS — M25.561 RIGHT KNEE PAIN, UNSPECIFIED CHRONICITY: Primary | ICD-10-CM

## 2025-04-10 DIAGNOSIS — M25.561 PAIN IN LATERAL PORTION OF RIGHT KNEE: ICD-10-CM

## 2025-04-10 RX ORDER — MELOXICAM 15 MG/1
15 TABLET ORAL DAILY
Qty: 30 TABLET | Refills: 1 | Status: SHIPPED | OUTPATIENT
Start: 2025-04-10

## 2025-04-10 NOTE — PROGRESS NOTES
Name: Odilon Brower  YOB: 2000  Gender: male  MRN: [id number]  Date of Encounter:  4/10/2025         CC / HPI:    History of Present Illness  The patient is a 25-year-old male presenting for a follow-up of right knee pain.    He has a history of ACL and meniscus surgery. Approximately a year ago, he began experiencing soreness in his right knee, which he initially attributed to overuse. Despite attempts at rest, the condition did not improve. He reports persistent pain and instability in the knee, with a lack of power during certain movements. His current occupation involves concrete pouring, which requires a healthy knee. He does not experience any locking or catching sensations in the knee but reports instability during single-leg movements and bending. He also notes a tendency to shift his weight to his left leg when rising from a seated position. He has not sought recent treatment with braces, physical therapy, or injections. His primary concern is the pain, which he believes is contributing to the instability. He expresses a desire to return to lifting weights without discomfort. He has a history of playing football and engaging in powerlifting activities, but due to the persistent pain, he has ceased these activities.    SOCIAL HISTORY  He works as a  at [employer].        PAST HISTORY:   Past medical, surgical, family, social history and allergies reviewed by me.       REVIEW OF SYSTEMS:   As noted in HPI.     PHYSICAL EXAMINATION:   Physical Exam  The right knee has a range of motion from 0 to 120 degrees. There is no swelling inside the joint. Tenderness is noted on the medial patellar facet and lateral patellar facet. The anterolateral joint line is tender. Both medial and lateral Jannette's tests are negative. The ACL graft feels stable. All ligaments feel stable. Cheryl's and Rodriguez's tests are negative.    DIAGNOSTIC IMAGING:   XR R knee AP lateral sunrise tunnel views

## 2025-04-11 ENCOUNTER — OFFICE VISIT (OUTPATIENT)
Age: 25
End: 2025-04-11
Payer: COMMERCIAL

## 2025-04-11 DIAGNOSIS — M54.50 LOW BACK PAIN, UNSPECIFIED BACK PAIN LATERALITY, UNSPECIFIED CHRONICITY, UNSPECIFIED WHETHER SCIATICA PRESENT: Primary | ICD-10-CM

## 2025-04-11 PROCEDURE — 99204 OFFICE O/P NEW MOD 45 MIN: CPT | Performed by: NURSE PRACTITIONER

## 2025-04-11 RX ORDER — METHOCARBAMOL 500 MG/1
500 TABLET, FILM COATED ORAL 2 TIMES DAILY PRN
Qty: 15 TABLET | Refills: 0 | Status: SHIPPED | OUTPATIENT
Start: 2025-04-11 | End: 2025-05-11

## 2025-04-11 NOTE — PROGRESS NOTES
problem with uncertain prognosis      Return in about 6 weeks (around 5/23/2025).     BEN Harrington - CNP  04/11/25      Elements of this note were created using speech recognition software.  As such, errors of speech recognition may be present.

## 2025-04-11 NOTE — PROGRESS NOTES
The patient was prescribed and fitted with a Lateral J-hinged knee brace for the right knee, size XL.     Patient read and signed documenting they understand and agree to [facility]'s current DME return policy.